# Patient Record
Sex: MALE | Race: AMERICAN INDIAN OR ALASKA NATIVE
[De-identification: names, ages, dates, MRNs, and addresses within clinical notes are randomized per-mention and may not be internally consistent; named-entity substitution may affect disease eponyms.]

---

## 2018-04-19 ENCOUNTER — HOSPITAL ENCOUNTER (EMERGENCY)
Dept: HOSPITAL 5 - ED | Age: 58
LOS: 1 days | Discharge: HOME | End: 2018-04-20
Payer: COMMERCIAL

## 2018-04-19 DIAGNOSIS — I10: Primary | ICD-10-CM

## 2018-04-19 DIAGNOSIS — E11.9: ICD-10-CM

## 2018-04-19 LAB
BASOPHILS # (AUTO): 0 K/MM3 (ref 0–0.1)
BASOPHILS NFR BLD AUTO: 0.2 % (ref 0–1.8)
BILIRUB UR QL STRIP: (no result)
BLOOD UR QL VISUAL: (no result)
EOSINOPHIL # BLD AUTO: 0.1 K/MM3 (ref 0–0.4)
EOSINOPHIL NFR BLD AUTO: 0.9 % (ref 0–4.3)
HCT VFR BLD CALC: 37.9 % (ref 35.5–45.6)
HGB BLD-MCNC: 12.5 GM/DL (ref 11.8–15.2)
HYALINE CASTS #/AREA URNS LPF: 11 /LPF
LYMPHOCYTES # BLD AUTO: 1.9 K/MM3 (ref 1.2–5.4)
LYMPHOCYTES NFR BLD AUTO: 20.8 % (ref 13.4–35)
MCH RBC QN AUTO: 30 PG (ref 28–32)
MCHC RBC AUTO-ENTMCNC: 33 % (ref 32–34)
MCV RBC AUTO: 90 FL (ref 84–94)
MONOCYTES # (AUTO): 1.1 K/MM3 (ref 0–0.8)
MONOCYTES % (AUTO): 11.6 % (ref 0–7.3)
MUCOUS THREADS #/AREA URNS HPF: (no result) /HPF
PH UR STRIP: 5 [PH] (ref 5–7)
PLATELET # BLD: 226 K/MM3 (ref 140–440)
PROT UR STRIP-MCNC: (no result) MG/DL
RBC # BLD AUTO: 4.22 M/MM3 (ref 3.65–5.03)
RBC #/AREA URNS HPF: 2 /HPF (ref 0–6)
UROBILINOGEN UR-MCNC: < 2 MG/DL (ref ?–2)
WBC #/AREA URNS HPF: 1 /HPF (ref 0–6)

## 2018-04-19 PROCEDURE — 81001 URINALYSIS AUTO W/SCOPE: CPT

## 2018-04-19 PROCEDURE — 93010 ELECTROCARDIOGRAM REPORT: CPT

## 2018-04-19 PROCEDURE — 36415 COLL VENOUS BLD VENIPUNCTURE: CPT

## 2018-04-19 PROCEDURE — 85025 COMPLETE CBC W/AUTO DIFF WBC: CPT

## 2018-04-19 PROCEDURE — 93005 ELECTROCARDIOGRAM TRACING: CPT

## 2018-04-19 PROCEDURE — 82962 GLUCOSE BLOOD TEST: CPT

## 2018-04-19 PROCEDURE — 80048 BASIC METABOLIC PNL TOTAL CA: CPT

## 2018-04-20 VITALS — SYSTOLIC BLOOD PRESSURE: 147 MMHG | DIASTOLIC BLOOD PRESSURE: 97 MMHG

## 2018-04-20 LAB
BUN SERPL-MCNC: 31 MG/DL (ref 9–20)
BUN/CREAT SERPL: 22 %
CALCIUM SERPL-MCNC: 9.1 MG/DL (ref 8.4–10.2)
HEMOLYSIS INDEX: 1

## 2018-04-20 NOTE — EMERGENCY DEPARTMENT REPORT
ED General Adult HPI





- General


Chief complaint: High BP


Stated complaint: HIGH BLOOD PRESSURE


Time Seen by Provider: 04/20/18 04:02


Source: patient


Mode of arrival: Ambulatory


Limitations: No Limitations





- History of Present Illness


Initial comments: 





Patient is 57-year-old male with history of hypertension he is on lisinopril 40 

mg and hydrochlorothiazide 25 mg.  Patient stated that before he went to bed he 

is blood pressure was high 190/99 and he started sweating but denied any chest 

pain at that moment or shortness of breath.  He denied any history of headache, 

weakness numbness or tingling sensation.  Patient stated that his blood 

pressures being up and down for the last few months.  He stated that he is very 

compliant with his diet and his medications.





- Related Data


 Home Medications











 Medication  Instructions  Recorded  Confirmed  Last Taken


 


Aspirin 325 mg PO DAILY 04/19/18 04/19/18 Unknown


 


HCTZ 25 mg PO DAILY 04/19/18 04/19/18 Unknown


 


Lisinopril 40 mg PO DAILY 04/19/18 04/19/18 Unknown


 


metFORMIN 1,000 mg PO BID 04/19/18 04/19/18 Unknown











 Allergies











Allergy/AdvReac Type Severity Reaction Status Date / Time


 


No Known Allergies Allergy   Unverified 04/19/18 23:25














ED Review of Systems


ROS: 


Stated complaint: HIGH BLOOD PRESSURE


Other details as noted in HPI





Comment: All other systems reviewed and negative


Constitutional: denies: chills, fever


Respiratory: denies: cough, shortness of breath, SOB with exertion, SOB at rest


Cardiovascular: denies: chest pain, palpitations


Gastrointestinal: denies: abdominal pain, nausea, vomiting, diarrhea, 

constipation, hematemesis, melena, hematochezia


Genitourinary: denies: urgency, dysuria, frequency, hematuria


Neurological: denies: headache, weakness, numbness, paresthesias, confusion, 

abnormal gait, vertigo





ED Past Medical Hx





- Past Medical History


Hx Hypertension: Yes


Hx Diabetes: Yes





- Surgical History


Past Surgical History?: No





- Social History


Smoking Status: Never Smoker


Substance Use Type: None





- Medications


Home Medications: 


 Home Medications











 Medication  Instructions  Recorded  Confirmed  Last Taken  Type


 


Aspirin 325 mg PO DAILY 04/19/18 04/19/18 Unknown History


 


HCTZ 25 mg PO DAILY 04/19/18 04/19/18 Unknown History


 


Lisinopril 40 mg PO DAILY 04/19/18 04/19/18 Unknown History


 


metFORMIN 1,000 mg PO BID 04/19/18 04/19/18 Unknown History














ED Physical Exam





- General


Limitations: No Limitations


General appearance: alert, in no apparent distress, anxious





- Head


Head exam: Present: atraumatic, normocephalic, normal inspection





- Eye


Eye exam: Present: normal appearance, PERRL





- ENT


ENT exam: Present: normal exam, normal orophraynx, mucous membranes moist





- Neck


Neck exam: Present: normal inspection, full ROM.  Absent: tenderness, 

meningismus, lymphadenopathy, thyromegaly





- Respiratory


Respiratory exam: Present: normal lung sounds bilaterally.  Absent: respiratory 

distress, wheezes, rales, rhonchi, chest wall tenderness, accessory muscle use, 

decreased breath sounds, prolonged expiratory





- Cardiovascular


Cardiovascular Exam: Present: regular rate, normal rhythm, normal heart sounds.

  Absent: bradycardia, tachycardia, irregular rhythm, systolic murmur, 

diastolic murmur, rubs





- GI/Abdominal


GI/Abdominal exam: Present: soft, normal bowel sounds.  Absent: distended, 

tenderness, guarding, rebound, rigid, organomegaly, mass, bruit, pulsatile mass

, hernia





- Back Exam


Back exam: Present: normal inspection, full ROM.  Absent: tenderness, CVA 

tenderness (L), muscle spasm, paraspinal tenderness





- Neurological Exam


Neurological exam: Present: alert, oriented X3, CN II-XII intact, normal gait





- Skin


Skin exam: Present: warm, intact, normal color





ED Course





 Vital Signs











  04/19/18 04/19/18 04/20/18





  22:46 23:11 03:56


 


Temperature 99.5 F 99.5 F 97.9 F


 


Pulse Rate 85 85 72


 


Respiratory 16 16 19





Rate   


 


Blood Pressure 155/102 155/102 


 


Blood Pressure   147/97





[Left]   


 


O2 Sat by Pulse 96 96 98





Oximetry   














ED Medical Decision Making





- Lab Data


Result diagrams: 


 04/19/18 23:35





 04/19/18 23:35





- EKG Data


-: EKG Interpreted by Me


EKG shows normal: sinus rhythm





- EKG Data


Interpretation: no acute changes


Critical care attestation.: 


If time is entered above; I have spent that time in minutes in the direct care 

of this critically ill patient, excluding procedure time.








ED Disposition


Clinical Impression: 


 Malignant hypertension





Disposition: DC-01 TO HOME OR SELFCARE


Is pt being admited?: No


Condition: Stable


Instructions:  Hypertension (ED)


Referrals: 


CHI SMITH MD [Primary Care Provider] - 3-5 Days